# Patient Record
Sex: MALE | Race: WHITE | NOT HISPANIC OR LATINO | Employment: FULL TIME | URBAN - METROPOLITAN AREA
[De-identification: names, ages, dates, MRNs, and addresses within clinical notes are randomized per-mention and may not be internally consistent; named-entity substitution may affect disease eponyms.]

---

## 2024-02-01 ENCOUNTER — HOSPITAL ENCOUNTER (EMERGENCY)
Facility: MEDICAL CENTER | Age: 59
End: 2024-02-01
Attending: EMERGENCY MEDICINE
Payer: COMMERCIAL

## 2024-02-01 ENCOUNTER — APPOINTMENT (OUTPATIENT)
Dept: RADIOLOGY | Facility: MEDICAL CENTER | Age: 59
End: 2024-02-01
Attending: EMERGENCY MEDICINE
Payer: COMMERCIAL

## 2024-02-01 ENCOUNTER — APPOINTMENT (OUTPATIENT)
Dept: RADIOLOGY | Facility: MEDICAL CENTER | Age: 59
End: 2024-02-01
Payer: COMMERCIAL

## 2024-02-01 VITALS
DIASTOLIC BLOOD PRESSURE: 62 MMHG | OXYGEN SATURATION: 93 % | HEIGHT: 72 IN | BODY MASS INDEX: 27.74 KG/M2 | HEART RATE: 93 BPM | TEMPERATURE: 98 F | WEIGHT: 204.81 LBS | SYSTOLIC BLOOD PRESSURE: 130 MMHG | RESPIRATION RATE: 13 BRPM

## 2024-02-01 DIAGNOSIS — R11.2 NAUSEA AND VOMITING, UNSPECIFIED VOMITING TYPE: ICD-10-CM

## 2024-02-01 DIAGNOSIS — R10.13 EPIGASTRIC PAIN: ICD-10-CM

## 2024-02-01 LAB
ABO GROUP BLD: NORMAL
ALBUMIN SERPL BCP-MCNC: 4.5 G/DL (ref 3.2–4.9)
ALBUMIN/GLOB SERPL: 1.6 G/DL
ALP SERPL-CCNC: 52 U/L (ref 30–99)
ALT SERPL-CCNC: 16 U/L (ref 2–50)
ANION GAP SERPL CALC-SCNC: 15 MMOL/L (ref 7–16)
APTT PPP: 28.7 SEC (ref 24.7–36)
AST SERPL-CCNC: 15 U/L (ref 12–45)
BASOPHILS # BLD AUTO: 0.2 % (ref 0–1.8)
BASOPHILS # BLD: 0.03 K/UL (ref 0–0.12)
BILIRUB SERPL-MCNC: 0.8 MG/DL (ref 0.1–1.5)
BLD GP AB SCN SERPL QL: NORMAL
BUN SERPL-MCNC: 13 MG/DL (ref 8–22)
CALCIUM ALBUM COR SERPL-MCNC: 11.6 MG/DL (ref 8.5–10.5)
CALCIUM SERPL-MCNC: 12 MG/DL (ref 8.5–10.5)
CHLORIDE SERPL-SCNC: 100 MMOL/L (ref 96–112)
CO2 SERPL-SCNC: 25 MMOL/L (ref 20–33)
CREAT SERPL-MCNC: 1.16 MG/DL (ref 0.5–1.4)
EKG IMPRESSION: NORMAL
EOSINOPHIL # BLD AUTO: 0.01 K/UL (ref 0–0.51)
EOSINOPHIL NFR BLD: 0.1 % (ref 0–6.9)
ERYTHROCYTE [DISTWIDTH] IN BLOOD BY AUTOMATED COUNT: 37.8 FL (ref 35.9–50)
GFR SERPLBLD CREATININE-BSD FMLA CKD-EPI: 72 ML/MIN/1.73 M 2
GLOBULIN SER CALC-MCNC: 2.8 G/DL (ref 1.9–3.5)
GLUCOSE SERPL-MCNC: 116 MG/DL (ref 65–99)
HCT VFR BLD AUTO: 43.9 % (ref 42–52)
HEMOCCULT STL QL: POSITIVE
HGB BLD-MCNC: 15.1 G/DL (ref 14–18)
IMM GRANULOCYTES # BLD AUTO: 0.04 K/UL (ref 0–0.11)
IMM GRANULOCYTES NFR BLD AUTO: 0.3 % (ref 0–0.9)
INR PPP: 1.01 (ref 0.87–1.13)
LIPASE SERPL-CCNC: 24 U/L (ref 11–82)
LYMPHOCYTES # BLD AUTO: 2.62 K/UL (ref 1–4.8)
LYMPHOCYTES NFR BLD: 20.9 % (ref 22–41)
MCH RBC QN AUTO: 29.4 PG (ref 27–33)
MCHC RBC AUTO-ENTMCNC: 34.4 G/DL (ref 32.3–36.5)
MCV RBC AUTO: 85.4 FL (ref 81.4–97.8)
MONOCYTES # BLD AUTO: 1.01 K/UL (ref 0–0.85)
MONOCYTES NFR BLD AUTO: 8.1 % (ref 0–13.4)
NEUTROPHILS # BLD AUTO: 8.83 K/UL (ref 1.82–7.42)
NEUTROPHILS NFR BLD: 70.4 % (ref 44–72)
NRBC # BLD AUTO: 0 K/UL
NRBC BLD-RTO: 0 /100 WBC (ref 0–0.2)
PLATELET # BLD AUTO: 264 K/UL (ref 164–446)
PMV BLD AUTO: 9.9 FL (ref 9–12.9)
POTASSIUM SERPL-SCNC: 3.7 MMOL/L (ref 3.6–5.5)
PROT SERPL-MCNC: 7.3 G/DL (ref 6–8.2)
PROTHROMBIN TIME: 13.4 SEC (ref 12–14.6)
RBC # BLD AUTO: 5.14 M/UL (ref 4.7–6.1)
RH BLD: NORMAL
SODIUM SERPL-SCNC: 140 MMOL/L (ref 135–145)
TROPONIN T SERPL-MCNC: 15 NG/L (ref 6–19)
WBC # BLD AUTO: 12.5 K/UL (ref 4.8–10.8)

## 2024-02-01 PROCEDURE — 36415 COLL VENOUS BLD VENIPUNCTURE: CPT

## 2024-02-01 PROCEDURE — 76705 ECHO EXAM OF ABDOMEN: CPT

## 2024-02-01 PROCEDURE — 82272 OCCULT BLD FECES 1-3 TESTS: CPT

## 2024-02-01 PROCEDURE — 84484 ASSAY OF TROPONIN QUANT: CPT

## 2024-02-01 PROCEDURE — A9270 NON-COVERED ITEM OR SERVICE: HCPCS | Performed by: EMERGENCY MEDICINE

## 2024-02-01 PROCEDURE — 86901 BLOOD TYPING SEROLOGIC RH(D): CPT

## 2024-02-01 PROCEDURE — 700102 HCHG RX REV CODE 250 W/ 637 OVERRIDE(OP): Performed by: EMERGENCY MEDICINE

## 2024-02-01 PROCEDURE — 85730 THROMBOPLASTIN TIME PARTIAL: CPT

## 2024-02-01 PROCEDURE — 86900 BLOOD TYPING SEROLOGIC ABO: CPT

## 2024-02-01 PROCEDURE — 99285 EMERGENCY DEPT VISIT HI MDM: CPT

## 2024-02-01 PROCEDURE — 700105 HCHG RX REV CODE 258: Performed by: EMERGENCY MEDICINE

## 2024-02-01 PROCEDURE — 71045 X-RAY EXAM CHEST 1 VIEW: CPT

## 2024-02-01 PROCEDURE — 85025 COMPLETE CBC W/AUTO DIFF WBC: CPT

## 2024-02-01 PROCEDURE — 86850 RBC ANTIBODY SCREEN: CPT

## 2024-02-01 PROCEDURE — 85610 PROTHROMBIN TIME: CPT

## 2024-02-01 PROCEDURE — 93005 ELECTROCARDIOGRAM TRACING: CPT | Performed by: EMERGENCY MEDICINE

## 2024-02-01 PROCEDURE — 83690 ASSAY OF LIPASE: CPT

## 2024-02-01 PROCEDURE — 80053 COMPREHEN METABOLIC PANEL: CPT

## 2024-02-01 RX ORDER — OXYCODONE HYDROCHLORIDE AND ACETAMINOPHEN 5; 325 MG/1; MG/1
1 TABLET ORAL ONCE
Status: COMPLETED | OUTPATIENT
Start: 2024-02-01 | End: 2024-02-01

## 2024-02-01 RX ORDER — SUCRALFATE 1 G/1
1 TABLET ORAL
Qty: 60 TABLET | Refills: 0 | Status: SHIPPED | OUTPATIENT
Start: 2024-02-01

## 2024-02-01 RX ORDER — FAMOTIDINE 20 MG/1
20 TABLET, FILM COATED ORAL ONCE
Status: COMPLETED | OUTPATIENT
Start: 2024-02-01 | End: 2024-02-01

## 2024-02-01 RX ORDER — ONDANSETRON 4 MG/1
4 TABLET, ORALLY DISINTEGRATING ORAL EVERY 6 HOURS PRN
Qty: 10 TABLET | Refills: 0 | Status: SHIPPED | OUTPATIENT
Start: 2024-02-01

## 2024-02-01 RX ORDER — SODIUM CHLORIDE 9 MG/ML
1000 INJECTION, SOLUTION INTRAVENOUS ONCE
Status: COMPLETED | OUTPATIENT
Start: 2024-02-01 | End: 2024-02-01

## 2024-02-01 RX ORDER — FAMOTIDINE 40 MG/1
40 TABLET, FILM COATED ORAL DAILY
Qty: 30 TABLET | Refills: 0 | Status: SHIPPED | OUTPATIENT
Start: 2024-02-01

## 2024-02-01 RX ADMIN — SODIUM CHLORIDE 1000 ML: 9 INJECTION, SOLUTION INTRAVENOUS at 13:47

## 2024-02-01 RX ADMIN — FAMOTIDINE 20 MG: 20 TABLET, FILM COATED ORAL at 13:39

## 2024-02-01 RX ADMIN — OXYCODONE AND ACETAMINOPHEN 1 TABLET: 5; 325 TABLET ORAL at 13:39

## 2024-02-01 RX ADMIN — LIDOCAINE HYDROCHLORIDE 30 ML: 20 SOLUTION ORAL; TOPICAL at 14:52

## 2024-02-01 NOTE — ED TRIAGE NOTES
"Chief Complaint   Patient presents with    Blood in Vomit     Started on Tuesday.      Pt ambulatory to triage for above complaint. Pt reports having bloody vomit since Tuesday and then has had a few \"black\" bowel movements. Pt reports abdominal pain on Tuesday as well.   "

## 2024-02-01 NOTE — ED NOTES
Patient discharged per order. Oral and written discharge instructions reviewed. Medications sent to pharmacy. New medications reviewed. All belongings accounted for and taken with patient. Questions answered, and patient agrees with discharge plan. Encouraged to follow up with PCP.

## 2024-02-01 NOTE — ED PROVIDER NOTES
ED Provider Note    Primary care provider: No primary care provider on file.    CHIEF COMPLAINT  Chief Complaint   Patient presents with    Blood in Vomit     Started on Tuesday.        HPI  Louie Au is a 58 y.o. male who presents to the Emergency Department with vomiting and abdominal pain.  The patient states that he had some food from a food truck about 48 hours ago, shortly after this he had an impressive amount of emesis, coffee-ground in nature with some associated burning and stabbing pain in the epigastric region.  Over the past 24 hours he has had minimal p.o. intake, he notes significant cramping in the abdomen, mild headaches, coffee-ground emesis and coffee-ground dark stool.  He has been taking Pepto-Bismol capsules, and his over-the-counter Nexium and 2 doses of ibuprofen for the headache.  He is visiting from [place] here for work.  Denies any history of abdominal surgeries.  Does take Nexium over-the-counter daily.  Has had previous colonoscopies that has been normal, no history of endoscopies.  Does have some increase in stress of late.    Has had subjective fevers and chills.      External Record Review: None available    REVIEW OF SYSTEMS  See HPI.     PAST MEDICAL HISTORY       SURGICAL HISTORY  patient denies any surgical history    SOCIAL HISTORY  Social History     Tobacco Use    Smoking status: Never    Smokeless tobacco: Never   Substance Use Topics    Alcohol use: Not Currently    Drug use: Never      Social History     Substance and Sexual Activity   Drug Use Never       FAMILY HISTORY  History reviewed. No pertinent family history.    CURRENT MEDICATIONS  Reviewed.  See Encounter Summary.     ALLERGIES  No Known Allergies    PHYSICAL EXAM  VITAL SIGNS: BP (!) 140/79   Pulse 88   Temp 36.6 °C (97.8 °F) (Temporal)   Resp 20   Ht 1.829 m (6')   Wt 92.9 kg (204 lb 12.9 oz)   SpO2 94%   BMI 27.78 kg/m²   Constitutional: Awake, alert in no apparent distress.  HENT: Normocephalic,  Bilateral external ears normal. Nose normal.   Eyes: Conjunctiva normal, non-icteric, EOMI.    Thorax & Lungs: Easy unlabored respirations, Clear to ascultation bilaterally.  Cardiovascular: Regular rate, Regular rhythm, No murmurs, rubs or gallops. Bilateral pulses symmetrical.   Abdomen:  Soft, epigastric and right upper quadrant abdominal tenderness without rebound or guarding, negative Lane's, nondistended, normal active bowel sounds.   Brown stool on rectal examination, fecal occult sent to the lab.  :    Skin: Visualized skin is  Dry, No erythema, No rash.   Musculoskeletal:   No cyanosis, clubbing or edema. No leg asymmetry.   Neurologic: Alert, Grossly non-focal.   Psychiatric: Normal affect, Normal mood  Lymphatic:      EKG   12 lead Interpreted by me  Rhythm:  Normal sinus rhythm   Rate: 90  Axis: normal  Ectopy: none  Conduction: normal  ST Segments: no acute change  T Waves: no acute change  Clinical Impression: Normal EKG without acute changes       RADIOLOGY  I have independently interpreted the diagnostic imaging associated with this visit and am waiting the final reading from the radiologist.   My preliminary interpretation is as follows: Normal gallbladder    Radiologist interpretation:   US-RUQ   Final Result      No evidence of gallstone or evidence of biliary ductal dilatation.      DX-CHEST-PORTABLE (1 VIEW)   Final Result      No acute cardiopulmonary abnormality.             COURSE & MEDICAL DECISION MAKING  Pertinent Labs & Imaging studies reviewed. (See chart for details)    COURSE & MEDICAL DECISION MAKING  Pertinent Labs & Imaging studies reviewed. (See chart for details)    Differential diagnoses include but are not limited to: Gastritis, pancreatitis, less likely biliary colic, sepsis, ACS    12:43 PM - Nursing notes reviewed, patient seen and examined at bedside.    Escalation of care considered, and ultimately not performed: acute inpatient care management, however at this time,  the patient is most appropriate for outpatient management.    Barriers to care at this time, including but not limited to: Patient lives in [facility], here on vacation    Decision tools and prescription drugs considered including, but not limited to: Heart score 1    Decision Making:  This is a pleasant 58 y.o. year old male who presents with gastric pain, vomiting, dark stools.  I suspect the coffee-ground stools have something to do with the patient's Pepto-Bismol.  He is not having melena on examination.  He is hemodynamically stable, not anemic, not tachycardic.  Do not suspect acute life-threatening GI bleed at this time.  Cardiac evaluation is unremarkable, no concerns for ACS.  I did obtain imaging of the gallbladder/right upper quadrant as he had some pain in this location, no signs of biliary disease.    He has been on Nexium regularly for history of acid reflux, suspect he may be developing a peptic ulcer or at the very least has some gastritis.  At this point he is stable for discharge, recommend Carafate, H2 blocker on top of this PPI, nausea occasions as needed.      Recommend follow-up with GI upon return back to [facility] if symptoms persist.       The patient was discharged home (see d/c instructions) was told to return immediately for any signs or symptoms listed, or any worsening at all.  The patient verbally agreed to the discharge precautions and follow-up plan which is documented in EPIC.    Discharge Medications:  New Prescriptions    FAMOTIDINE (PEPCID) 40 MG TAB    Take 1 Tablet by mouth every day.    ONDANSETRON (ZOFRAN ODT) 4 MG TABLET DISPERSIBLE    Take 1 Tablet by mouth every 6 hours as needed for Nausea/Vomiting.    SUCRALFATE (CARAFATE) 1 GM TAB    Take 1 Tablet by mouth 4 Times a Day,Before Meals and at Bedtime.       FINAL IMPRESSION  1. Nausea and vomiting, unspecified vomiting type    2. Epigastric pain